# Patient Record
Sex: FEMALE | Race: WHITE | NOT HISPANIC OR LATINO | Employment: FULL TIME | URBAN - METROPOLITAN AREA
[De-identification: names, ages, dates, MRNs, and addresses within clinical notes are randomized per-mention and may not be internally consistent; named-entity substitution may affect disease eponyms.]

---

## 2019-10-07 ENCOUNTER — OFFICE VISIT (OUTPATIENT)
Dept: GASTROENTEROLOGY | Facility: CLINIC | Age: 34
End: 2019-10-07
Payer: COMMERCIAL

## 2019-10-07 VITALS
BODY MASS INDEX: 28.31 KG/M2 | SYSTOLIC BLOOD PRESSURE: 109 MMHG | DIASTOLIC BLOOD PRESSURE: 76 MMHG | HEART RATE: 76 BPM | WEIGHT: 140.44 LBS | HEIGHT: 59 IN

## 2019-10-07 DIAGNOSIS — R19.7 DIARRHEA, UNSPECIFIED TYPE: Primary | ICD-10-CM

## 2019-10-07 DIAGNOSIS — R10.9 ABDOMINAL CRAMPING: ICD-10-CM

## 2019-10-07 PROCEDURE — 99204 PR OFFICE/OUTPT VISIT, NEW, LEVL IV, 45-59 MIN: ICD-10-PCS | Mod: S$GLB,,, | Performed by: NURSE PRACTITIONER

## 2019-10-07 PROCEDURE — 3008F PR BODY MASS INDEX (BMI) DOCUMENTED: ICD-10-PCS | Mod: CPTII,S$GLB,, | Performed by: NURSE PRACTITIONER

## 2019-10-07 PROCEDURE — 99999 PR PBB SHADOW E&M-NEW PATIENT-LVL III: ICD-10-PCS | Mod: PBBFAC,,, | Performed by: NURSE PRACTITIONER

## 2019-10-07 PROCEDURE — 99204 OFFICE O/P NEW MOD 45 MIN: CPT | Mod: S$GLB,,, | Performed by: NURSE PRACTITIONER

## 2019-10-07 PROCEDURE — 3008F BODY MASS INDEX DOCD: CPT | Mod: CPTII,S$GLB,, | Performed by: NURSE PRACTITIONER

## 2019-10-07 PROCEDURE — 99999 PR PBB SHADOW E&M-NEW PATIENT-LVL III: CPT | Mod: PBBFAC,,, | Performed by: NURSE PRACTITIONER

## 2019-10-07 RX ORDER — LOPERAMIDE HYDROCHLORIDE 2 MG/1
2 CAPSULE ORAL 4 TIMES DAILY PRN
COMMUNITY

## 2019-10-07 NOTE — PROGRESS NOTES
"    [facility] Consultation Note    Reason for Consult:  The primary encounter diagnosis was Diarrhea, unspecified type. A diagnosis of Abdominal cramping was also pertinent to this visit.    PCP:   Primary Doctor No   No address on file    Referring MD:  Aaareferral Self  No address on file    HPI:  This is a 34 y.o. female here for evaluation of diarrhea and abdominal cramping. She is a new patient.    Past 2 days had diarrhea and abdominal pain. Feels like she is improving. Saturday having 2 an hour. Sunday had to work, took imodium and Keopectate, has a couple of BMs in the am and later at night.  +Abdominal cramping today. Has had 6 BMs but they are very small, "looks like sand in the bottom of toilet"  Denies chance of food poisoning, abx use.  No mucous   Hematochezia a few weeks ago, aside from this.   +Chronic constipation, possible hemmorhoids  Father was worried about him and called to make an appt.  A little nausea, no vomiting    ROS:  Constitutional: No fevers, +chills, No weight loss  ENT: No allergies  CV: No chest pain  Pulm: No cough, No shortness of breath  Ophtho: No vision changes  GI: see HPI  Derm: No rash  MSK: No arthritis  : No dysuria, No hematuria  Neuro: No syncope, No seizure  Psych: No anxiety, No depression    Medical History:  has no past medical history on file.    Surgical History:  has a past surgical history that includes Reduction of both breasts.    Family History: family history includes Irritable bowel syndrome in her mother..     Social History:  reports that she has never smoked. She has never used smokeless tobacco. She reports that she drinks alcohol. She reports that she has current or past drug history.    Review of patient's allergies indicates:  No Known Allergies    Current Outpatient Medications on File Prior to Visit   Medication Sig Dispense Refill    IBUPROFEN ORAL Take by mouth.      loperamide (IMODIUM) 2 mg capsule Take 2 mg by mouth 4 " "(four) times daily as needed for Diarrhea.       No current facility-administered medications on file prior to visit.          Objective Findings:    Vital Signs:  /76 (BP Location: Left arm)   Pulse 76   Ht 4' 11" (1.499 m)   Wt 63.7 kg (140 lb 6.9 oz)   BMI 28.36 kg/m²   Body mass index is 28.36 kg/m².    Physical Exam:  General Appearance: Well appearing in no acute distress  Head:   Normocephalic, without obvious abnormality  Eyes:    No scleral icterus  ENT: Neck supple  Lungs: CTA bilaterally in anterior and posterior fields, no wheezes, no crackles.  Heart:  Regular rate and rhythm, S1, S2 normal, no murmurs heard  Abdomen: Soft, non tender, non distended with positive bowel sounds in all four quadrants. No hepatosplenomegaly, ascites, or mass  Extremities: No edema  Skin: No rash  Neurologic: AAO x 3      Labs:  Lab Results   Component Value Date    WBC 10.68 07/06/2004    HGB 12.3 07/06/2004    HCT 40.0 07/06/2004     (H) 07/06/2004    ALT 12 07/06/2004    AST 14 07/06/2004     07/06/2004    K 4.2 07/06/2004     07/06/2004    CREATININE 0.8 07/06/2004    BUN 13 07/06/2004    CO2 19 (L) 07/06/2004    INR 1.0 07/06/2004       Imaging:  None  Endoscopy:    None    Assessment:    Ms. Hackett is a 34 y.o. WF with:    1. Diarrhea, unspecified type    2. Abdominal cramping       Acute onset. Explained to pt this could likely be self limited illness especially with signs of improvement after 48 hrs.     Recommendations:  1. Labs  2. Stool studies    F/u pending above    Order summary:         Thank you so much for allowing me to participate in the care of Joy Hackett    JOCELIN Keenan        "

## 2019-10-08 ENCOUNTER — LAB VISIT (OUTPATIENT)
Dept: LAB | Facility: HOSPITAL | Age: 34
End: 2019-10-08
Attending: NURSE PRACTITIONER
Payer: COMMERCIAL

## 2019-10-08 ENCOUNTER — TELEPHONE (OUTPATIENT)
Dept: GASTROENTEROLOGY | Facility: CLINIC | Age: 34
End: 2019-10-08

## 2019-10-08 DIAGNOSIS — R19.7 DIARRHEA, UNSPECIFIED TYPE: ICD-10-CM

## 2019-10-08 DIAGNOSIS — R10.9 ABDOMINAL CRAMPING: ICD-10-CM

## 2019-10-08 LAB
ALBUMIN SERPL BCP-MCNC: 3.3 G/DL (ref 3.5–5.2)
ALP SERPL-CCNC: 64 U/L (ref 55–135)
ALT SERPL W/O P-5'-P-CCNC: 22 U/L (ref 10–44)
ANION GAP SERPL CALC-SCNC: 8 MMOL/L (ref 8–16)
AST SERPL-CCNC: 19 U/L (ref 10–40)
BASOPHILS # BLD AUTO: 0.03 K/UL (ref 0–0.2)
BASOPHILS NFR BLD: 0.5 % (ref 0–1.9)
BILIRUB SERPL-MCNC: 0.2 MG/DL (ref 0.1–1)
BUN SERPL-MCNC: 7 MG/DL (ref 6–20)
CALCIUM SERPL-MCNC: 9.3 MG/DL (ref 8.7–10.5)
CHLORIDE SERPL-SCNC: 102 MMOL/L (ref 95–110)
CO2 SERPL-SCNC: 27 MMOL/L (ref 23–29)
CREAT SERPL-MCNC: 0.8 MG/DL (ref 0.5–1.4)
CRP SERPL-MCNC: 92.7 MG/L (ref 0–8.2)
DIFFERENTIAL METHOD: ABNORMAL
EOSINOPHIL # BLD AUTO: 0.1 K/UL (ref 0–0.5)
EOSINOPHIL NFR BLD: 1.9 % (ref 0–8)
ERYTHROCYTE [DISTWIDTH] IN BLOOD BY AUTOMATED COUNT: 13.2 % (ref 11.5–14.5)
ERYTHROCYTE [SEDIMENTATION RATE] IN BLOOD BY WESTERGREN METHOD: 17 MM/HR (ref 0–36)
EST. GFR  (AFRICAN AMERICAN): >60 ML/MIN/1.73 M^2
EST. GFR  (NON AFRICAN AMERICAN): >60 ML/MIN/1.73 M^2
GLUCOSE SERPL-MCNC: 84 MG/DL (ref 70–110)
HCT VFR BLD AUTO: 41.9 % (ref 37–48.5)
HGB BLD-MCNC: 13.1 G/DL (ref 12–16)
IMM GRANULOCYTES # BLD AUTO: 0.02 K/UL (ref 0–0.04)
IMM GRANULOCYTES NFR BLD AUTO: 0.3 % (ref 0–0.5)
LYMPHOCYTES # BLD AUTO: 2.4 K/UL (ref 1–4.8)
LYMPHOCYTES NFR BLD: 41 % (ref 18–48)
MCH RBC QN AUTO: 27.1 PG (ref 27–31)
MCHC RBC AUTO-ENTMCNC: 31.3 G/DL (ref 32–36)
MCV RBC AUTO: 87 FL (ref 82–98)
MONOCYTES # BLD AUTO: 0.6 K/UL (ref 0.3–1)
MONOCYTES NFR BLD: 10.8 % (ref 4–15)
NEUTROPHILS # BLD AUTO: 2.7 K/UL (ref 1.8–7.7)
NEUTROPHILS NFR BLD: 45.5 % (ref 38–73)
NRBC BLD-RTO: 0 /100 WBC
PLATELET # BLD AUTO: 381 K/UL (ref 150–350)
PMV BLD AUTO: 9.9 FL (ref 9.2–12.9)
POTASSIUM SERPL-SCNC: 3.8 MMOL/L (ref 3.5–5.1)
PROT SERPL-MCNC: 6.8 G/DL (ref 6–8.4)
RBC # BLD AUTO: 4.83 M/UL (ref 4–5.4)
SODIUM SERPL-SCNC: 137 MMOL/L (ref 136–145)
WBC # BLD AUTO: 5.83 K/UL (ref 3.9–12.7)

## 2019-10-08 PROCEDURE — 86140 C-REACTIVE PROTEIN: CPT

## 2019-10-08 PROCEDURE — 80053 COMPREHEN METABOLIC PANEL: CPT

## 2019-10-08 PROCEDURE — 36415 COLL VENOUS BLD VENIPUNCTURE: CPT | Mod: PO

## 2019-10-08 PROCEDURE — 85025 COMPLETE CBC W/AUTO DIFF WBC: CPT

## 2019-10-08 PROCEDURE — 86677 HELICOBACTER PYLORI ANTIBODY: CPT

## 2019-10-08 PROCEDURE — 85652 RBC SED RATE AUTOMATED: CPT

## 2019-10-08 NOTE — TELEPHONE ENCOUNTER
Ma spoke to pt to give test results per Becki. Pt verbalized understanding. MA also told pt that [facility] would like pt to turn in stool soon. Pt stated she doesn't really have it anymore. She did collect a little last nightand asked about how much . MA told her to fill it to the 20-1 line , because if its not enough the lab will make her repeat it. MA also told pt one of the test [facility] is testing for has to be a liquid stool. Pt verbalized understanding.         ----- Message from Emiliana Brower sent at 10/8/2019  4:47 PM CDT -----  Contact:  pt  [phone]  Pt is returning call.  Please call pt.

## 2019-10-08 NOTE — TELEPHONE ENCOUNTER
MA  contacted pt to give lab results per [facility] pt did not answer, MA left a message to call the clinic back.         ----- Message from Becki Gonzalez NP sent at 10/8/2019  3:13 PM CDT -----  Inflammatory lab is elevated. She needs to turn in her stool

## 2019-10-09 ENCOUNTER — LAB VISIT (OUTPATIENT)
Dept: LAB | Facility: HOSPITAL | Age: 34
End: 2019-10-09
Attending: NURSE PRACTITIONER
Payer: COMMERCIAL

## 2019-10-09 DIAGNOSIS — R10.9 ABDOMINAL CRAMPING: ICD-10-CM

## 2019-10-09 DIAGNOSIS — R19.7 DIARRHEA, UNSPECIFIED TYPE: ICD-10-CM

## 2019-10-09 LAB
C DIFF GDH STL QL: NEGATIVE
C DIFF TOX A+B STL QL IA: NEGATIVE

## 2019-10-09 PROCEDURE — 87045 FECES CULTURE AEROBIC BACT: CPT

## 2019-10-09 PROCEDURE — 87449 NOS EACH ORGANISM AG IA: CPT

## 2019-10-09 PROCEDURE — 87046 STOOL CULTR AEROBIC BACT EA: CPT | Mod: 59

## 2019-10-09 PROCEDURE — 87209 SMEAR COMPLEX STAIN: CPT

## 2019-10-09 PROCEDURE — 87328 CRYPTOSPORIDIUM AG IA: CPT

## 2019-10-09 PROCEDURE — 87329 GIARDIA AG IA: CPT

## 2019-10-09 PROCEDURE — 87427 SHIGA-LIKE TOXIN AG IA: CPT | Mod: 59

## 2019-10-10 LAB
CRYPTOSP AG STL QL IA: NEGATIVE
G LAMBLIA AG STL QL IA: NEGATIVE

## 2019-10-11 LAB
E COLI SXT1 STL QL IA: NEGATIVE
E COLI SXT2 STL QL IA: NEGATIVE
H PYLORI IGG SERPL QL IA: NEGATIVE
O+P STL MICRO: NORMAL

## 2019-10-13 ENCOUNTER — TELEPHONE (OUTPATIENT)
Dept: GASTROENTEROLOGY | Facility: CLINIC | Age: 34
End: 2019-10-13

## 2019-10-14 LAB — BACTERIA STL CULT: NORMAL

## 2019-10-14 NOTE — TELEPHONE ENCOUNTER
MA contacted pt to give test results per Becki. Pt did not answer, MA left message to give the clinic a call back. MA also wanted to ask her a few questions per Becki.

## 2022-06-06 DIAGNOSIS — R07.89 OTHER CHEST PAIN: Primary | ICD-10-CM

## 2022-06-07 DIAGNOSIS — R07.89 OTHER CHEST PAIN: Primary | ICD-10-CM
